# Patient Record
Sex: FEMALE | Race: WHITE | NOT HISPANIC OR LATINO
[De-identification: names, ages, dates, MRNs, and addresses within clinical notes are randomized per-mention and may not be internally consistent; named-entity substitution may affect disease eponyms.]

---

## 2018-02-26 ENCOUNTER — RESULT REVIEW (OUTPATIENT)
Age: 63
End: 2018-02-26

## 2018-08-19 ENCOUNTER — TRANSCRIPTION ENCOUNTER (OUTPATIENT)
Age: 63
End: 2018-08-19

## 2018-08-23 ENCOUNTER — TRANSCRIPTION ENCOUNTER (OUTPATIENT)
Age: 63
End: 2018-08-23

## 2018-12-27 ENCOUNTER — TRANSCRIPTION ENCOUNTER (OUTPATIENT)
Age: 63
End: 2018-12-27

## 2019-02-18 ENCOUNTER — RECORD ABSTRACTING (OUTPATIENT)
Age: 64
End: 2019-02-18

## 2019-02-18 DIAGNOSIS — Z80.8 FAMILY HISTORY OF MALIGNANT NEOPLASM OF OTHER ORGANS OR SYSTEMS: ICD-10-CM

## 2019-02-18 DIAGNOSIS — Z87.891 PERSONAL HISTORY OF NICOTINE DEPENDENCE: ICD-10-CM

## 2019-02-18 DIAGNOSIS — Z78.9 OTHER SPECIFIED HEALTH STATUS: ICD-10-CM

## 2019-02-18 DIAGNOSIS — Z82.49 FAMILY HISTORY OF ISCHEMIC HEART DISEASE AND OTHER DISEASES OF THE CIRCULATORY SYSTEM: ICD-10-CM

## 2019-02-18 DIAGNOSIS — Z85.038 PERSONAL HISTORY OF OTHER MALIGNANT NEOPLASM OF LARGE INTESTINE: ICD-10-CM

## 2019-02-18 DIAGNOSIS — I10 ESSENTIAL (PRIMARY) HYPERTENSION: ICD-10-CM

## 2019-02-18 DIAGNOSIS — Z86.39 PERSONAL HISTORY OF OTHER ENDOCRINE, NUTRITIONAL AND METABOLIC DISEASE: ICD-10-CM

## 2019-02-18 LAB — CYTOLOGY CVX/VAG DOC THIN PREP: NEGATIVE

## 2019-02-18 RX ORDER — NAPROXEN SODIUM 220 MG
220 TABLET ORAL
Refills: 0 | Status: ACTIVE | COMMUNITY

## 2019-03-04 ENCOUNTER — APPOINTMENT (OUTPATIENT)
Dept: OBGYN | Facility: CLINIC | Age: 64
End: 2019-03-04
Payer: COMMERCIAL

## 2019-03-04 VITALS
WEIGHT: 186 LBS | SYSTOLIC BLOOD PRESSURE: 118 MMHG | HEIGHT: 61 IN | DIASTOLIC BLOOD PRESSURE: 74 MMHG | BODY MASS INDEX: 35.12 KG/M2

## 2019-03-04 DIAGNOSIS — Z12.31 ENCOUNTER FOR SCREENING MAMMOGRAM FOR MALIGNANT NEOPLASM OF BREAST: ICD-10-CM

## 2019-03-04 PROCEDURE — 99396 PREV VISIT EST AGE 40-64: CPT

## 2019-03-11 PROBLEM — Z82.49 FAMILY HISTORY OF CARDIAC DISORDER: Status: ACTIVE | Noted: 2019-02-18

## 2019-03-11 PROBLEM — Z85.038 HISTORY OF MALIGNANT NEOPLASM OF COLON: Status: RESOLVED | Noted: 2019-02-18 | Resolved: 2019-03-11

## 2019-03-11 PROBLEM — Z87.891 FORMER SMOKER: Status: ACTIVE | Noted: 2019-02-18

## 2019-03-11 LAB
CYTOLOGY CVX/VAG DOC THIN PREP: NORMAL
CYTOLOGY CVX/VAG DOC THIN PREP: NORMAL

## 2019-03-11 RX ORDER — IBUPROFEN 200 MG
600 CAPSULE ORAL
Refills: 0 | Status: ACTIVE | COMMUNITY

## 2019-03-11 RX ORDER — UBIDECARENONE/VIT E ACET 100MG-5
50 MCG CAPSULE ORAL
Refills: 0 | Status: ACTIVE | COMMUNITY

## 2019-07-01 ENCOUNTER — FORM ENCOUNTER (OUTPATIENT)
Age: 64
End: 2019-07-01

## 2019-07-02 ENCOUNTER — FORM ENCOUNTER (OUTPATIENT)
Age: 64
End: 2019-07-02

## 2020-03-09 ENCOUNTER — APPOINTMENT (OUTPATIENT)
Dept: OBGYN | Facility: CLINIC | Age: 65
End: 2020-03-09
Payer: COMMERCIAL

## 2020-03-09 VITALS
BODY MASS INDEX: 35.5 KG/M2 | WEIGHT: 188 LBS | DIASTOLIC BLOOD PRESSURE: 80 MMHG | HEIGHT: 61 IN | SYSTOLIC BLOOD PRESSURE: 122 MMHG

## 2020-03-09 PROCEDURE — 99396 PREV VISIT EST AGE 40-64: CPT

## 2020-09-15 ENCOUNTER — FORM ENCOUNTER (OUTPATIENT)
Age: 65
End: 2020-09-15

## 2020-10-13 ENCOUNTER — FORM ENCOUNTER (OUTPATIENT)
Age: 65
End: 2020-10-13

## 2021-02-20 ENCOUNTER — HOSPITAL ENCOUNTER (EMERGENCY)
Dept: HOSPITAL 74 - FER | Age: 66
Discharge: HOME | End: 2021-02-20
Payer: COMMERCIAL

## 2021-02-20 VITALS — BODY MASS INDEX: 35.6 KG/M2

## 2021-02-20 VITALS — DIASTOLIC BLOOD PRESSURE: 82 MMHG | HEART RATE: 102 BPM | TEMPERATURE: 99.8 F | SYSTOLIC BLOOD PRESSURE: 148 MMHG

## 2021-02-20 DIAGNOSIS — R22.31: Primary | ICD-10-CM

## 2021-02-20 LAB
ALBUMIN SERPL-MCNC: 3.9 G/DL (ref 3.4–5)
ALP SERPL-CCNC: 69 U/L (ref 45–117)
ALT SERPL-CCNC: 21 U/L (ref 13–61)
ANION GAP SERPL CALC-SCNC: 9 MMOL/L (ref 8–16)
AST SERPL-CCNC: 18 U/L (ref 15–37)
BASOPHILS # BLD: 1.3 % (ref 0–2)
BILIRUB SERPL-MCNC: 0.6 MG/DL (ref 0.2–1)
BUN SERPL-MCNC: 14 MG/DL (ref 7–18)
CALCIUM SERPL-MCNC: 9.7 MG/DL (ref 8.5–10)
CHLORIDE SERPL-SCNC: 103 MMOL/L (ref 98–107)
CO2 SERPL-SCNC: 25 MMOL/L (ref 21–32)
CREAT SERPL-MCNC: 0.7 MG/DL (ref 0.55–1.3)
DEPRECATED RDW RBC AUTO: 14.1 % (ref 11.6–15.6)
EOSINOPHIL # BLD: 0.8 % (ref 0–4.5)
GLUCOSE SERPL-MCNC: 90 MG/DL (ref 74–106)
HCT VFR BLD CALC: 40.6 % (ref 32.4–45.2)
HGB BLD-MCNC: 13.8 GM/DL (ref 10.7–15.3)
INR BLD: 1.2 (ref 0.82–1.09)
LYMPHOCYTES # BLD: 22.2 % (ref 8–40)
MCH RBC QN AUTO: 31 PG (ref 25.7–33.7)
MCHC RBC AUTO-ENTMCNC: 33.9 G/DL (ref 32–36)
MCV RBC: 91.4 FL (ref 80–96)
MONOCYTES # BLD AUTO: 9 % (ref 3.8–10.2)
NEUTROPHILS # BLD: 66.7 % (ref 42.8–82.8)
PLATELET # BLD AUTO: 222 K/MM3 (ref 134–434)
PMV BLD: 10.1 FL (ref 7.5–11.1)
POTASSIUM SERPLBLD-SCNC: 4 MMOL/L (ref 3.5–5.1)
PROT SERPL-MCNC: 6.9 G/DL (ref 6.4–8.2)
PT PNL PPP: 13.3 SEC (ref 10.2–13)
RBC # BLD AUTO: 4.44 M/MM3 (ref 3.6–5.2)
SODIUM SERPL-SCNC: 137 MMOL/L (ref 136–145)
WBC # BLD AUTO: 11.4 K/MM3 (ref 4–10.8)

## 2021-02-22 LAB — URATE SERPL-SCNC: 5.4 MG/DL (ref 2.6–7.2)

## 2021-03-22 ENCOUNTER — TRANSCRIPTION ENCOUNTER (OUTPATIENT)
Age: 66
End: 2021-03-22

## 2021-03-22 ENCOUNTER — APPOINTMENT (OUTPATIENT)
Dept: OBGYN | Facility: CLINIC | Age: 66
End: 2021-03-22
Payer: COMMERCIAL

## 2021-03-22 VITALS
SYSTOLIC BLOOD PRESSURE: 144 MMHG | DIASTOLIC BLOOD PRESSURE: 78 MMHG | WEIGHT: 202 LBS | BODY MASS INDEX: 38.14 KG/M2 | HEIGHT: 61 IN

## 2021-03-22 DIAGNOSIS — Z00.00 ENCOUNTER FOR GENERAL ADULT MEDICAL EXAMINATION W/OUT ABNORMAL FINDINGS: ICD-10-CM

## 2021-03-22 PROCEDURE — 99396 PREV VISIT EST AGE 40-64: CPT

## 2021-03-22 PROCEDURE — 99072 ADDL SUPL MATRL&STAF TM PHE: CPT

## 2021-03-22 RX ORDER — EZETIMIBE 10 MG/1
10 TABLET ORAL
Qty: 30 | Refills: 0 | Status: ACTIVE | COMMUNITY
Start: 2021-02-21

## 2021-03-22 NOTE — HISTORY OF PRESENT ILLNESS
[Patient reported mammogram was normal] : Patient reported mammogram was normal [FreeTextEntry1] : 66yo  postmenopausal without HRT here for Gyn exam. Pt had COVID in January, received Monoclonal Antibodies 21. Currently taking Augmentin for UTI(self treated with Augmentin left over from COVID treatment as sx developed as she was preparing to go to daughter's [employer] ceremony on Friday)Pt is S/P partial colon resection for colon Ca found in large polyp on colonoscopy (no invasion into bowel wall/negative nodes). Had  colonoscopy in February_. benign rectal polyps. Sees Dr. HEIDY Thompson for annual breast surveillance. Last Mammo at [place] 2020. Saw an endocrinologist at [facility] for thyroid cysts on U/S-> no intervention at this time with f/u in 18 months. Has back issues(spinal stenosis, disc problems) and is seeing pain management, had epidural.   [Mammogramdate] : 09/2020 [TextBox_19] : [facility]/Dr. Thompson [PapSmeardate] : 2/26/18 [TextBox_31] : Neg/HPV Neg [BoneDensityDate] : 6/22/18 [TextBox_37] : T Score Spine +0.5 Hip -0.5 Fem Neck -1.0 [ColonoscopyDate] : 09/2020 [TextBox_43] : benign polyps (Dr. Marley) 5 year recall

## 2021-03-24 LAB — HPV HIGH+LOW RISK DNA PNL CVX: NOT DETECTED

## 2021-03-28 LAB — CYTOLOGY CVX/VAG DOC THIN PREP: ABNORMAL

## 2021-09-27 ENCOUNTER — NON-APPOINTMENT (OUTPATIENT)
Age: 66
End: 2021-09-27

## 2021-09-27 DIAGNOSIS — N63.10 UNSPECIFIED LUMP IN THE RIGHT BREAST, UNSPECIFIED QUADRANT: ICD-10-CM

## 2021-09-29 ENCOUNTER — HOSPITAL ENCOUNTER (OUTPATIENT)
Dept: HOSPITAL 74 - JRADUS-SUR | Age: 66
Discharge: HOME | End: 2021-09-29
Attending: PHYSICIAN ASSISTANT
Payer: COMMERCIAL

## 2021-09-29 DIAGNOSIS — C50.911: Primary | ICD-10-CM

## 2021-09-29 PROCEDURE — 0H9T3ZX DRAINAGE OF RIGHT BREAST, PERCUTANEOUS APPROACH, DIAGNOSTIC: ICD-10-PCS

## 2021-09-29 PROCEDURE — A4648 IMPLANTABLE TISSUE MARKER: HCPCS

## 2021-10-07 ENCOUNTER — RESULT REVIEW (OUTPATIENT)
Age: 66
End: 2021-10-07

## 2021-10-08 ENCOUNTER — APPOINTMENT (OUTPATIENT)
Dept: BREAST CENTER | Facility: CLINIC | Age: 66
End: 2021-10-08
Payer: COMMERCIAL

## 2021-10-08 VITALS — HEIGHT: 61 IN | BODY MASS INDEX: 37.57 KG/M2 | WEIGHT: 199 LBS

## 2021-10-08 DIAGNOSIS — C50.411 MALIGNANT NEOPLASM OF UPPER-OUTER QUADRANT OF RIGHT FEMALE BREAST: ICD-10-CM

## 2021-10-08 DIAGNOSIS — R92.8 OTHER ABNORMAL AND INCONCLUSIVE FINDINGS ON DIAGNOSTIC IMAGING OF BREAST: ICD-10-CM

## 2021-10-08 DIAGNOSIS — Z80.3 FAMILY HISTORY OF MALIGNANT NEOPLASM OF BREAST: ICD-10-CM

## 2021-10-08 DIAGNOSIS — Z17.0 MALIGNANT NEOPLASM OF UPPER-OUTER QUADRANT OF RIGHT FEMALE BREAST: ICD-10-CM

## 2021-10-08 PROCEDURE — 99215 OFFICE O/P EST HI 40 MIN: CPT

## 2021-10-08 NOTE — REASON FOR VISIT
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  She gets yearly clinical breast exam and comes in today after she was diagnosed with a 6 mm spiculated nodule seen on mammography and ultrasound in the right breast 10:00 region measuring about 5 mm 12 cm from the nipple for which ultrasound-guided core biopsy performed on September 29, 2021 showed an invasive duct cancer moderately differentiated which was ER/SC positive and she comes in for a surgical evaluation.

## 2021-10-08 NOTE — ASSESSMENT
[FreeTextEntry1] : The patient is a 65-year-old G3, P2 postmenopausal white female.  She underwent menarche at age 12 and had her first child at age 34.  She underwent menopause at age 47 and never took any hormone replacement therapy.  She has a family history of breast cancer with her maternal cousin who had breast cancer at age 45.  The patient has 2 maternal aunts who had colon cancer in their 70s.  The patient's sister had thyroid cancer in her 40s.  The patient herself underwent a sigmoid colon resection for small polypoid colon cancer which was performed by Dr. Jose Angel Bush at [facility] back in June 2006 which she did not require any adjuvant therapy.  She continues to get routine colonoscopies.  She underwent a bilateral mammography and ultrasound at [place] on September 27, 2021 which showed a right breast 10:00 6 mm spiculated nodule on mammography and a corresponding 4 x 5 mm nodule at the 10 o'clock position 12 cm from the nipple with benign appearing lymph nodes.  Ultrasound-guided core biopsy performed on September 29, 2021 showed a moderately differentiated invasive duct cancer which was ER positive at 75% and NE positive at 25% and HER-2/tim negative by IHC with a Ki-67 of 5%.  I reviewed her mammography and ultrasound from [place] from September 27, 2021 which indeed showed this highly suspicious right breast 10:00 density on mammo and ultrasound.  On exam today, she just has some bruising in the upper outer aspect of the right breast but no suspicious masses or adenopathy. I spoke to the patient and her  at length regarding this newly diagnosed right breast upper outer quadrant breast cancer.  I had her get a bilateral breast MRI which was performed on October 7, 2021 and final pathology just showed a localized cancer in the right breast upper outer quadrant measuring about 2 cm with no suspicious adenopathy..  Given her strong family history of colon cancer as well as breast cancer in the family she was referred for genetic testing.  She understands that if this cancer is localized she is an excellent candidate for partial mastectomy and sentinel lymph node biopsy.  She understands the equivalent survival rates with partial mastectomy and radiation versus mastectomy.  She understands that chemotherapy will ultimately would depend on the final pathology.  All her questions were answered and she understands all risk benefits of this cancer and treatment.  She could be a candidate for intraoperative radiation and this was discussed with the patient.  She would like to get other opinions and is going to see consultation down to [facility].  She will give us a call back if she decides to proceed with surgery here.  We would then need to get her slides reviewed.

## 2021-10-08 NOTE — HISTORY OF PRESENT ILLNESS
[FreeTextEntry1] : The patient is a 65-year-old G3, P2 postmenopausal white female.  She underwent menarche at age 12 and had her first child at age 34.  She underwent menopause at age 47 and never took any hormone replacement therapy.  She has a family history of breast cancer with her maternal cousin who had breast cancer at age 45.  The patient has 2 maternal aunts who had colon cancer in their 70s.  The patient's sister had thyroid cancer in her 40s.  The patient herself underwent a sigmoid colon resection for small polypoid colon cancer which was performed by Dr. Jose Angel Bush at [facility] back in June 2006 which she did not require any adjuvant therapy.  She continues to get routine colonoscopies.  She underwent a bilateral mammography and ultrasound at [place] on September 27, 2021 which showed a right breast 10:00 6 mm spiculated nodule on mammography and a corresponding 4 x 5 mm nodule at the 10 o'clock position 12 cm from the nipple with benign appearing lymph nodes.  Ultrasound-guided core biopsy performed on September 29, 2021 showed a moderately differentiated invasive duct cancer which was ER positive at 75% and MO positive at 25% and HER-2/tim negative by IHC with a Ki-67 of 5%.  She comes in for a surgical evaluation.

## 2021-10-08 NOTE — PHYSICAL EXAM
[Normocephalic] : normocephalic [Atraumatic] : atraumatic [EOMI] : extra ocular movement intact [Supple] : supple [No Supraclavicular Adenopathy] : no supraclavicular adenopathy [No Cervical Adenopathy] : no cervical adenopathy [Examined in the supine and seated position] : examined in the supine and seated position [No dominant masses] : no dominant masses in right breast  [No dominant masses] : no dominant masses left breast [No Nipple Retraction] : no left nipple retraction [No Nipple Discharge] : no left nipple discharge [Breast Mass Right Breast ___cm] : no masses [Breast Mass Left Breast ___cm] : no masses [Breast Nipple Inversion] : nipples not inverted [Breast Nipple Retraction] : nipples not retracted [Breast Nipple Flattening] : nipples not flattened [Breast Nipple Fissures] : nipples not fissured [Breast Abnormal Lactation (Galactorrhea)] : no galactorrhea [Breast Abnormal Secretion Bloody Fluid] : no bloody discharge [Breast Abnormal Secretion Opalescent Fluid] : no milky discharge [Breast Abnormal Secretion Serous Fluid] : no serous discharge [No Axillary Lymphadenopathy] : no left axillary lymphadenopathy [No Edema] : no edema [No Rashes] : no rashes [No Ulceration] : no ulceration [de-identified] : On exam, the patient has C-cup breasts with some bruising on the upper outer aspect of the right breast from her recent needle core biopsy.  I cannot feel any suspicious densities in either breast.  She has no axillary, supraclavicular, or cervical adenopathy. [de-identified] : Bruising in upper outer quadrant from recent core biopsy with no suspicious masses or adenopathy. [de-identified] : Bruising in upper outer quadrant of the right breast

## 2021-10-13 ENCOUNTER — APPOINTMENT (OUTPATIENT)
Dept: HEMATOLOGY ONCOLOGY | Facility: CLINIC | Age: 66
End: 2021-10-13

## 2021-10-19 ENCOUNTER — NON-APPOINTMENT (OUTPATIENT)
Age: 66
End: 2021-10-19

## 2021-10-26 ENCOUNTER — RESULT REVIEW (OUTPATIENT)
Age: 66
End: 2021-10-26

## 2021-11-01 ENCOUNTER — NON-APPOINTMENT (OUTPATIENT)
Age: 66
End: 2021-11-01

## 2022-03-28 ENCOUNTER — APPOINTMENT (OUTPATIENT)
Dept: OBGYN | Facility: CLINIC | Age: 67
End: 2022-03-28
Payer: COMMERCIAL

## 2022-03-28 VITALS
SYSTOLIC BLOOD PRESSURE: 122 MMHG | DIASTOLIC BLOOD PRESSURE: 68 MMHG | BODY MASS INDEX: 36.06 KG/M2 | HEIGHT: 61 IN | WEIGHT: 191 LBS

## 2022-03-28 PROCEDURE — 99397 PER PM REEVAL EST PAT 65+ YR: CPT

## 2022-03-28 RX ORDER — SODIUM PICOSULFATE, MAGNESIUM OXIDE, AND ANHYDROUS CITRIC ACID 10; 3.5; 12 MG/160ML; G/160ML; G/160ML
10-3.5-12 MG-GM LIQUID ORAL
Qty: 320 | Refills: 0 | Status: COMPLETED | COMMUNITY
Start: 2021-02-10 | End: 2022-03-28

## 2022-03-28 RX ORDER — ALBUTEROL SULFATE 90 UG/1
108 (90 BASE) INHALANT RESPIRATORY (INHALATION)
Qty: 8 | Refills: 0 | Status: COMPLETED | COMMUNITY
Start: 2021-01-04 | End: 2022-03-28

## 2022-03-28 RX ORDER — AMOXICILLIN AND CLAVULANATE POTASSIUM 875; 125 MG/1; MG/1
875-125 TABLET, COATED ORAL
Qty: 20 | Refills: 0 | Status: COMPLETED | COMMUNITY
Start: 2021-02-22 | End: 2022-03-28

## 2022-03-28 RX ORDER — AZITHROMYCIN 250 MG/1
250 TABLET, FILM COATED ORAL
Qty: 6 | Refills: 0 | Status: COMPLETED | COMMUNITY
Start: 2021-01-06 | End: 2022-03-28

## 2022-03-28 RX ORDER — ONDANSETRON 4 MG/1
4 TABLET ORAL
Qty: 5 | Refills: 0 | Status: COMPLETED | COMMUNITY
Start: 2020-10-07 | End: 2022-03-28

## 2022-03-28 RX ORDER — SULFAMETHOXAZOLE AND TRIMETHOPRIM 800; 160 MG/1; MG/1
800-160 TABLET ORAL
Qty: 20 | Refills: 0 | Status: COMPLETED | COMMUNITY
Start: 2021-02-22 | End: 2022-03-28

## 2022-03-28 RX ORDER — BUDESONIDE AND FORMOTEROL FUMARATE DIHYDRATE 160; 4.5 UG/1; UG/1
160-4.5 AEROSOL RESPIRATORY (INHALATION)
Qty: 10 | Refills: 0 | Status: COMPLETED | COMMUNITY
Start: 2021-01-19 | End: 2022-03-28

## 2022-03-28 RX ORDER — FLUTICASONE PROPIONATE 110 UG/1
110 AEROSOL, METERED RESPIRATORY (INHALATION)
Qty: 12 | Refills: 0 | Status: COMPLETED | COMMUNITY
Start: 2021-01-07 | End: 2022-03-28

## 2022-03-28 RX ORDER — NYSTATIN 100000 [USP'U]/ML
100000 SUSPENSION ORAL
Qty: 120 | Refills: 0 | Status: COMPLETED | COMMUNITY
Start: 2021-01-31 | End: 2022-03-28

## 2022-03-28 RX ORDER — DEXAMETHASONE 0.75 MG/.75MG
0.75 TABLET ORAL
Qty: 60 | Refills: 0 | Status: COMPLETED | COMMUNITY
Start: 2021-01-19 | End: 2022-03-28

## 2022-03-28 RX ORDER — IBUPROFEN 400 MG/1
400 TABLET, FILM COATED ORAL
Qty: 15 | Refills: 0 | Status: ACTIVE | COMMUNITY
Start: 2021-11-29

## 2022-03-28 RX ORDER — DEXAMETHASONE 6 MG/1
6 TABLET ORAL
Qty: 5 | Refills: 0 | Status: COMPLETED | COMMUNITY
Start: 2021-01-06 | End: 2022-03-28

## 2022-03-28 NOTE — HISTORY OF PRESENT ILLNESS
[FreeTextEntry1] : 67yo  postmenopausal now on Tamoxifen  here for Gyn exam. Pt had right lumpectomy at [facility] for T1 invasive ductal Ca ER/NC+ [id number]. She just completed adjuvant  RT. Had [employer] genetic screening-> Negative.Pt is S/P partial colon resection for colon Ca found in large polyp on colonoscopy (no invasion into bowel wall/negative nodes). Has regular colonoscopy(Dr. Marley). Saw an endocrinologist at [facility] for thyroid cysts on U/S-> no intervention at this time. Has back issues(spinal stenosis, disc problems) and is seeing pain management, had epidural. Will be having bilateral knee replacements over the summer(HSS) Pt had COVID and received Monoclonal Antibodies 21, has received 3 doses Pfizer vaccine.. \par \par \par \par OB History:  Total pregnancies  2.  Total living children  2.  Pregnancy 1:  normal spontaneous vaginal delivery ().  Pregnancy 2:  normal spontaneous vaginal delivery (   \par  [Mammogramdate] : 9/27/21 [TextBox_19] : BIRADS 5 [BreastSonogramDate] : 9/27/21 [TextBox_25] : BIRADS 5 [PapSmeardate] : 3/22/21 [TextBox_31] : Neg/HPV Neg [BoneDensityDate] : 6/22/18 [TextBox_37] : T Score Spine +0.5 Hip -0.5 Fem Neck -1.0.  [ColonoscopyDate] : 12/2021 [TextBox_43] : Negative

## 2023-04-27 ENCOUNTER — APPOINTMENT (OUTPATIENT)
Dept: OBGYN | Facility: CLINIC | Age: 68
End: 2023-04-27
Payer: COMMERCIAL

## 2023-04-27 VITALS
WEIGHT: 198 LBS | SYSTOLIC BLOOD PRESSURE: 120 MMHG | DIASTOLIC BLOOD PRESSURE: 71 MMHG | BODY MASS INDEX: 38.87 KG/M2 | HEIGHT: 60 IN

## 2023-04-27 PROCEDURE — 99397 PER PM REEVAL EST PAT 65+ YR: CPT

## 2023-04-27 RX ORDER — VITAMIN E 268 MG
500 CAPSULE ORAL
Refills: 0 | Status: COMPLETED | COMMUNITY
End: 2023-04-27

## 2023-04-27 RX ORDER — LISINOPRIL 10 MG/1
10 TABLET ORAL
Refills: 0 | Status: COMPLETED | COMMUNITY
End: 2023-04-27

## 2023-04-27 RX ORDER — ROSUVASTATIN CALCIUM 5 MG/1
5 TABLET, FILM COATED ORAL
Qty: 30 | Refills: 0 | Status: COMPLETED | COMMUNITY
Start: 2022-03-08 | End: 2023-04-27

## 2023-04-27 RX ORDER — DICLOFENAC SODIUM 75 MG/1
75 TABLET, DELAYED RELEASE ORAL
Qty: 60 | Refills: 0 | Status: COMPLETED | COMMUNITY
Start: 2022-01-11 | End: 2023-04-27

## 2023-04-27 RX ORDER — UBIDECARENONE 50 MG
600 CAPSULE ORAL
Refills: 0 | Status: COMPLETED | COMMUNITY
End: 2023-04-27

## 2023-04-27 RX ORDER — TRIAMCINOLONE ACETONIDE 1 MG/G
0.1 CREAM TOPICAL
Qty: 454 | Refills: 0 | Status: COMPLETED | COMMUNITY
Start: 2022-01-07 | End: 2023-04-27

## 2023-04-27 RX ORDER — RAMIPRIL 5 MG/1
5 CAPSULE ORAL
Refills: 0 | Status: ACTIVE | COMMUNITY

## 2023-04-27 RX ORDER — INDOMETHACIN 50 MG/1
50 CAPSULE ORAL
Qty: 21 | Refills: 0 | Status: COMPLETED | COMMUNITY
Start: 2021-02-23 | End: 2023-04-27

## 2023-04-27 NOTE — HISTORY OF PRESENT ILLNESS
[FreeTextEntry1] : story of Present Illness\par 68yo  postmenopausal on Tamoxifen here for Gyn exam. Pt had right lumpectomy at [facility] for T1 invasive ductal Ca ER/GA+ [id number]. She then completed adjuvant RT. Had [employer] genetic screening-> Negative.Pt is S/P partial colon resection for colon Ca found in large polyp on colonoscopy (no invasion into bowel wall/negative nodes). Has regular colonoscopy(Dr. Marley). Saw an endocrinologist at [facility] for thyroid cysts on U/S-> no intervention at this time. Has back issues(spinal stenosis, disc problems) and is seeing pain management, had epidural. Had  bilateral knee replacements last year at [facility]. Stopped Tamoxafin for that period. Daughter is in [place] doing a veterinary fellowship. Her son lives locally has an 18month old grandson that she sees often.\par \par \par \par OB History: Total pregnancies 2. Total living children 2. Pregnancy 1: normal spontaneous vaginal delivery (). Pregnancy 2: normal spontaneous vaginal delivery ( \par \par \par Preventative Visit: \par Mammogram: 21, BIRADS 5. \par Breast Sonogram: 21, BIRADS 5. \par PAP Smear: 3/22/21, Neg/HPV Neg. \par Bone Density: 18, T Score Spine +0.5 Hip -0.5 Fem Neck -1.0. \par Colonoscopy: 2021, Negative. \par

## 2024-05-30 ENCOUNTER — APPOINTMENT (OUTPATIENT)
Dept: OBGYN | Facility: CLINIC | Age: 69
End: 2024-05-30
Payer: COMMERCIAL

## 2024-05-30 VITALS
WEIGHT: 205 LBS | DIASTOLIC BLOOD PRESSURE: 70 MMHG | HEIGHT: 60 IN | BODY MASS INDEX: 40.25 KG/M2 | SYSTOLIC BLOOD PRESSURE: 130 MMHG

## 2024-05-30 DIAGNOSIS — Z01.419 ENCOUNTER FOR GYNECOLOGICAL EXAMINATION (GENERAL) (ROUTINE) W/OUT ABNORMAL FINDINGS: ICD-10-CM

## 2024-05-30 DIAGNOSIS — Z79.810 LONG TERM (CURRENT) USE OF SELECTIVE ESTROGEN RECEPTOR MODULATORS (SERMS): ICD-10-CM

## 2024-05-30 PROCEDURE — 99397 PER PM REEVAL EST PAT 65+ YR: CPT

## 2024-05-30 RX ORDER — TAMOXIFEN CITRATE 20 MG/1
20 TABLET, FILM COATED ORAL
Qty: 30 | Refills: 0 | Status: COMPLETED | COMMUNITY
Start: 2022-01-18 | End: 2024-05-30

## 2024-05-30 NOTE — HISTORY OF PRESENT ILLNESS
[FreeTextEntry1] : 67yo  postmenopausal on Tamoxifen here for Gyn exam. Pt had right lumpectomy at [facility] for T1 invasive ductal Ca ER/WI+ [id number]. She then completed adjuvant RT. Had [employer] genetic screening-> Negative. She is S/P partial colon resection for colon Ca found in large polyp on colonoscopy (no invasion into bowel wall/negative nodes). Has regular colonoscopy(Dr. Marley). Saw an endocrinologist at [facility] for thyroid cysts on U/S-> no intervention at this time. Has back issues(spinal stenosis, disc problems) Had right hip replacement(HSS) last year. Had bilateral knee replacements prior to that. Stopped Tamoxafin this year with oncologists agreement as symptoms were awful.       OB History: Total pregnancies 2. Total living children 2. Pregnancy 1: normal spontaneous vaginal delivery (). Pregnancy 2: normal spontaneous vaginal delivery (      Preventative Visit:  Mammogram: 10/2023 [facility]  Breast Sonogram: 21, BIRADS 5.  PAP Smear: 3/22/21, Neg/HPV Neg.  Bone Density:  [facility]  Colonoscopy: 2021, Negative

## 2024-05-31 LAB — HPV HIGH+LOW RISK DNA PNL CVX: NOT DETECTED

## 2024-06-05 LAB — CYTOLOGY CVX/VAG DOC THIN PREP: NORMAL

## 2024-12-25 PROBLEM — F10.90 ALCOHOL USE: Status: ACTIVE | Noted: 2019-02-18

## 2025-06-25 ENCOUNTER — APPOINTMENT (OUTPATIENT)
Dept: OBGYN | Facility: CLINIC | Age: 70
End: 2025-06-25
Payer: MEDICARE

## 2025-06-25 VITALS
WEIGHT: 195 LBS | SYSTOLIC BLOOD PRESSURE: 109 MMHG | HEIGHT: 60 IN | DIASTOLIC BLOOD PRESSURE: 62 MMHG | BODY MASS INDEX: 38.28 KG/M2

## 2025-06-25 PROCEDURE — 99397 PER PM REEVAL EST PAT 65+ YR: CPT | Mod: GY

## 2025-06-25 RX ORDER — RAMIPRIL 10 MG/1
10 CAPSULE ORAL
Refills: 0 | Status: ACTIVE | COMMUNITY

## 2025-06-25 RX ORDER — AMLODIPINE BESYLATE 5 MG/1
5 TABLET ORAL
Refills: 0 | Status: ACTIVE | COMMUNITY

## 2025-06-25 RX ORDER — ASPIRIN 81 MG
81 TABLET, DELAYED RELEASE (ENTERIC COATED) ORAL
Refills: 0 | Status: ACTIVE | COMMUNITY